# Patient Record
Sex: FEMALE | Race: WHITE | NOT HISPANIC OR LATINO | Employment: FULL TIME | URBAN - METROPOLITAN AREA
[De-identification: names, ages, dates, MRNs, and addresses within clinical notes are randomized per-mention and may not be internally consistent; named-entity substitution may affect disease eponyms.]

---

## 2022-03-21 PROBLEM — Z34.90 PREGNANCY: Status: ACTIVE | Noted: 2022-03-21

## 2022-03-21 PROBLEM — N91.2 AMENORRHEA: Status: ACTIVE | Noted: 2022-03-21

## 2022-04-10 ENCOUNTER — HISTORICAL (OUTPATIENT)
Dept: ADMINISTRATIVE | Facility: HOSPITAL | Age: 32
End: 2022-04-10

## 2022-04-30 VITALS
WEIGHT: 124.13 LBS | HEIGHT: 64 IN | BODY MASS INDEX: 21.19 KG/M2 | DIASTOLIC BLOOD PRESSURE: 79 MMHG | SYSTOLIC BLOOD PRESSURE: 121 MMHG

## 2025-01-28 ENCOUNTER — TELEPHONE (OUTPATIENT)
Dept: OTOLARYNGOLOGY | Facility: CLINIC | Age: 35
End: 2025-01-28
Payer: COMMERCIAL

## 2025-01-28 NOTE — TELEPHONE ENCOUNTER
----- Message from Bill sent at 1/28/2025  3:54 PM CST -----  Contact: Self  Type:  Patient Returning Call    Who Called:  Patient   Who Left Message for Patient:  Irene  Does the patient know what this is regarding?:  Yes  Best Call Back Number:  [phone]   Additional Information:

## 2025-01-28 NOTE — TELEPHONE ENCOUNTER
Spoke w/pt per call back msg for scheduling.  W/pt, ENT appt scheduled for 01/31 @ 1115 w/[facility].  Pt confirmed appt date/time.  Pt has no further ENT questions/concerns at this time.

## 2025-01-28 NOTE — TELEPHONE ENCOUNTER
Attempted to reach out to pt due to missed appt on 01/24 w/[facility] and per [place] request for scheduling.  No answer; LVM for call back to schedule for 01/31 @ 1115.  Pt portal msg to be sent to pt due to failed call attempt.

## 2025-01-31 ENCOUNTER — OFFICE VISIT (OUTPATIENT)
Dept: OTOLARYNGOLOGY | Facility: CLINIC | Age: 35
End: 2025-01-31
Payer: COMMERCIAL

## 2025-01-31 VITALS — HEIGHT: 64 IN | WEIGHT: 134.06 LBS | BODY MASS INDEX: 22.89 KG/M2

## 2025-01-31 DIAGNOSIS — J38.00 PARALYSIS OF VOCAL CORDS AND LARYNX, UNSPECIFIED: ICD-10-CM

## 2025-01-31 DIAGNOSIS — J02.9 SORE THROAT: ICD-10-CM

## 2025-01-31 DIAGNOSIS — R49.0 DYSPHONIA: Primary | ICD-10-CM

## 2025-01-31 DIAGNOSIS — J38.01 PARESIS OF RIGHT VOCAL CORD: ICD-10-CM

## 2025-01-31 LAB — GROUP A STREP, MOLECULAR: NEGATIVE

## 2025-01-31 PROCEDURE — 1160F RVW MEDS BY RX/DR IN RCRD: CPT | Mod: CPTII,S$GLB,, | Performed by: PHYSICIAN ASSISTANT

## 2025-01-31 PROCEDURE — 3008F BODY MASS INDEX DOCD: CPT | Mod: CPTII,S$GLB,, | Performed by: PHYSICIAN ASSISTANT

## 2025-01-31 PROCEDURE — 87651 STREP A DNA AMP PROBE: CPT | Performed by: PHYSICIAN ASSISTANT

## 2025-01-31 PROCEDURE — 99999 PR PBB SHADOW E&M-EST. PATIENT-LVL IV: CPT | Mod: PBBFAC,,, | Performed by: PHYSICIAN ASSISTANT

## 2025-01-31 PROCEDURE — 1159F MED LIST DOCD IN RCRD: CPT | Mod: CPTII,S$GLB,, | Performed by: PHYSICIAN ASSISTANT

## 2025-01-31 PROCEDURE — 31575 DIAGNOSTIC LARYNGOSCOPY: CPT | Mod: S$GLB,,, | Performed by: PHYSICIAN ASSISTANT

## 2025-01-31 PROCEDURE — 87070 CULTURE OTHR SPECIMN AEROBIC: CPT | Performed by: PHYSICIAN ASSISTANT

## 2025-01-31 PROCEDURE — 99203 OFFICE O/P NEW LOW 30 MIN: CPT | Mod: 25,S$GLB,, | Performed by: PHYSICIAN ASSISTANT

## 2025-01-31 RX ORDER — PREDNISONE 10 MG/1
TABLET ORAL
Qty: 83 TABLET | Refills: 0 | Status: SHIPPED | OUTPATIENT
Start: 2025-01-31

## 2025-01-31 NOTE — PATIENT INSTRUCTIONS
Disp Refills Start End SWAPNIL   predniSONE (DELTASONE) 10 MG tablet 83 tablet 0 1/31/2025 -- No   Sig: Take 6 tabs (60mg) PO daily x 10 days, then 4 tabs (40mg) PO daily x 3 days, then 2 tabs (20mg) PO daily x 3 days, then 1 tab (10mg) PO daily x 3 days, then 1/2 tab (5mg) PO daily x 3 days.

## 2025-01-31 NOTE — PROGRESS NOTES
"[facility]    Subjective:      Patient: Jayna Lyons Patient PCP: Marleni Bourgeois MD         :  1990     Sex:  female      MRN:  [id number]          Date of Visit: 2025      Chief Complaint:     Patient ID: Jayna Lyons is a 34 y.o. female non-smoker who presents to office for evaluation of throat issues with dysphonia.     Throat pain:   Voice change:  Dysphagia/ondynophagia:  Cough:  PND:  GERD history:  Tobacco/smoking History:  Last Surgery requiring intubation:   Current job:     Past Medical History  She has a past medical history of Mental disorder.    Family History  Her family history includes Breast cancer in her paternal aunt.    Past Surgical History:   Procedure Laterality Date    GALLBLADDER SURGERY  2013    HIP SURGERY Right 2016    ats revision / jurgen    SHOULDER SURGERY      TONSILLECTOMY, ADENOIDECTOMY      WISDOM TOOTH EXTRACTION       Social History     Tobacco Use    Smoking status: Former     Types: Cigarettes    Smokeless tobacco: Not on file    Tobacco comments:     ZYN pouches every now and then   Substance and Sexual Activity    Alcohol use: No    Drug use: No    Sexual activity: Yes     Medications  She has a current medication list which includes the following prescription(s): dextroamphetamine-amphetamine, l norgest/e.estradiol-e.estrad, and prednisone.    Review of patient's allergies indicates:   Allergen Reactions    Augmentin [amoxicillin-pot clavulanate]     Doxycycline      Other Reaction(s): stomach upset    Adhesive Rash     Adhesive bandages     All medications, allergies, and past history have been reviewed.    Objective:      Vitals:      2024     9:14 AM 2024     9:36 AM 2025    11:02 AM   Vitals - 1 value per visit   SYSTOLIC 118 110    DIASTOLIC 72 72    Weight (lb) 131 134.4 134.04   Weight (kg) 59.421 60.963 60.8   Height 5' 4" (1.626 m) 5' 4" (1.626 m) 5' 4" (1.626 m)   BMI (Calculated) 22.5 23.1 23   Pain Score   Zero       Body " surface area is 1.66 meters squared.  Physical Exam  Constitutional:       General: She is not in acute distress.     Appearance: Normal appearance. She is not ill-appearing.   HENT:      Head: Normocephalic and atraumatic.      Right Ear: Tympanic membrane, ear canal and external ear normal.      Left Ear: Tympanic membrane, ear canal and external ear normal.      Nose: Nose normal.      Mouth/Throat:      Lips: Pink. No lesions.      Mouth: Mucous membranes are moist. No oral lesions.      Tongue: No lesions.      Palate: No lesions.      Pharynx: Oropharynx is clear. Uvula midline. No pharyngeal swelling, oropharyngeal exudate, posterior oropharyngeal erythema or uvula swelling.   Eyes:      General:         Right eye: No discharge.         Left eye: No discharge.      Extraocular Movements: Extraocular movements intact.      Conjunctiva/sclera: Conjunctivae normal.   Pulmonary:      Effort: Pulmonary effort is normal.   Neurological:      General: No focal deficit present.      Mental Status: She is alert and oriented to person, place, and time. Mental status is at baseline.   Psychiatric:         Mood and Affect: Mood normal.         Behavior: Behavior normal.         Thought Content: Thought content normal.         Judgment: Judgment normal.         Labs:  WBC   Date Value Ref Range Status   08/18/2022 9.78 4.3 - 10.3 10E3/ul Final     Eosinophil %   Date Value Ref Range Status   08/18/2022 1.3 0 - 7 % Final     Eos #   Date Value Ref Range Status   08/18/2022 0.13 0.0 - 0.7 10E3/ul Final     Platelets   Date Value Ref Range Status   08/18/2022 216 145 - 375 10E3/ul Final     Imaging:    All lab results, imaging results, and data have been reviewed.    Assessment:        ICD-10-CM ICD-9-CM   1. Dysphonia  R49.0 784.42   2. Paresis of right vocal cord  J38.01 478.31   3. Sore throat  J02.9 462   4. Paralysis of vocal cords and larynx, unspecified  J38.00 478.30            Plan:      Dysphonia  -      Laryngoscopy  -     Ambulatory Referral/Consult to Speech Therapy; Future; Expected date: 02/07/2025  -     predniSONE (DELTASONE) 10 MG tablet; Take 6 tabs (60mg) PO daily x 10 days, then 4 tabs (40mg) PO daily x 3 days, then 2 tabs (20mg) PO daily x 3 days, then 1 tab (10mg) PO daily x 3 days, then 1/2 tab (5mg) PO daily x 3 days.  Dispense: 83 tablet; Refill: 0    Paresis of right vocal cord  -     predniSONE (DELTASONE) 10 MG tablet; Take 6 tabs (60mg) PO daily x 10 days, then 4 tabs (40mg) PO daily x 3 days, then 2 tabs (20mg) PO daily x 3 days, then 1 tab (10mg) PO daily x 3 days, then 1/2 tab (5mg) PO daily x 3 days.  Dispense: 83 tablet; Refill: 0    Sore throat  -     CULTURE, RESPIRATORY  - THROAT  -     Group A Strep, Molecular    Paralysis of vocal cords and larynx, unspecified  -     CT Soft Tissue Neck With Contrast; Future; Expected date: 01/31/2025          erythema of residual tonsil tissue. Rapid strep and throat culture obtained today in office. Will reach out to pt with results and recommendations of swabs. Pt is to start predniSONE (DELTASONE) 10 MG tablet; Take 6 tabs (60mg) PO daily x 10 days, then 4 tabs (40mg) PO daily x 3 days, then 2 tabs (20mg) PO daily x 3 days, then 1 tab (10mg) PO daily x 3 days, then 1/2 tab (5mg) PO daily x 3 days. 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 Pt lives in [place], [place], so will do virtual visit follow up. Follow up with audiovisual visit in 1 month.

## 2025-01-31 NOTE — PROCEDURES
Laryngoscopy    Date/Time: 1/31/2025 11:15 AM    Performed by: Malcolm Fraga PA-C  Authorized by: Malcolm Fraga PA-C    Anesthesia:     Local anesthetic:  Lidocaine 4% and Alen-Synephrine 1/2%    Patient tolerance:  Patient tolerated the procedure well with no immediate complications    Decongestion performed?: Yes    Laryngoscopy:     Areas examined:  Nasopharynx, oropharynx, hypopharynx, larynx and vocal cords    Laryngoscope size: disposable ambu flexible scope.  Nose External:      No external nasal deformity  Nasopharynx:      No mucosa lesions     Adenoids not present     Posterior choanae patent     Eustachian tube patent  Larynx/hypopharynx:      No epiglottis lesions     No epiglottis edema     No AE folds lesions     No vocal cord polyps     No hypopharynx lesions     No piriform sinus pooling     No piriform sinus lesions     No post cricoid edema     No post cricoid erythema     Right vocal cord paresis-mildly adducted. Left vocal cord without paresis or paralysis. Mid-glottic gapping upon phonation.

## 2025-02-03 DIAGNOSIS — A49.2 HAEMOPHILUS INFLUENZAE INFECTION: Primary | ICD-10-CM

## 2025-02-03 DIAGNOSIS — J02.9 SORE THROAT: Primary | ICD-10-CM

## 2025-02-03 LAB — BACTERIA THROAT CULT: ABNORMAL

## 2025-02-03 RX ORDER — LEVOFLOXACIN 750 MG/1
750 TABLET ORAL DAILY
Qty: 7 TABLET | Refills: 0 | Status: SHIPPED | OUTPATIENT
Start: 2025-02-03 | End: 2025-02-10

## 2025-02-06 ENCOUNTER — TELEPHONE (OUTPATIENT)
Dept: OTOLARYNGOLOGY | Facility: CLINIC | Age: 35
End: 2025-02-06
Payer: COMMERCIAL

## 2025-02-06 NOTE — TELEPHONE ENCOUNTER
"----- Message from Ewa sent at 2/6/2025 11:29 AM CST -----  Regarding: NP SPEECH THERAPY LEOLA  Good day to you:    The [facility] received your order to get the attached patient scheduled for an evaluation. We have reached out to the patient to schedule them for an evaluation; however, we have been unsuccessful in reaching the patient.      We wanted you to be aware that your patient has not started therapy. If you speak with them again about starting therapy please have them reach out to us at [phone] to get scheduled.    Also, wanted to make you aware that Cheryl De Leon is no longer with [facility].      Sincerely,  Ewa "Ms. Ortiz" [place]  Access Navigator  [phone] FAX: [phone]  [email or web address]  "

## 2025-03-05 ENCOUNTER — TELEPHONE (OUTPATIENT)
Dept: OTOLARYNGOLOGY | Facility: CLINIC | Age: 35
End: 2025-03-05
Payer: COMMERCIAL

## 2025-03-05 NOTE — TELEPHONE ENCOUNTER
"----- Message from Ewa sent at 3/5/2025  9:36 AM CST -----  Regarding: NP SPEECH THERAPY LEOLA  Good day to you:The [facility] received your order to get the attached patient scheduled for an evaluation. We have reached out to the patient to schedule them for an evaluation; however, we have been unsuccessful in reaching the patient.  We wanted you to be aware that your patient has not started therapy. If you speak with them again about starting therapy please have them reach out to us at [phone] to get scheduled.Best regards,Ewa "Ms. Ortiz" Sulema [phone] FAX: [phone]  "

## 2025-03-24 DIAGNOSIS — J02.9 THROAT INFECTION: Primary | ICD-10-CM

## 2025-03-24 DIAGNOSIS — J02.9 THROAT INFECTION: ICD-10-CM

## 2025-03-24 RX ORDER — LEVOFLOXACIN 750 MG/1
750 TABLET ORAL DAILY
Qty: 10 TABLET | Refills: 0 | Status: SHIPPED | OUTPATIENT
Start: 2025-03-24 | End: 2025-04-03

## 2025-03-24 RX ORDER — LEVOFLOXACIN 750 MG/1
750 TABLET ORAL DAILY
Qty: 10 TABLET | Refills: 0 | Status: SHIPPED | OUTPATIENT
Start: 2025-03-24 | End: 2025-03-24 | Stop reason: SDUPTHER